# Patient Record
Sex: FEMALE | Race: WHITE
[De-identification: names, ages, dates, MRNs, and addresses within clinical notes are randomized per-mention and may not be internally consistent; named-entity substitution may affect disease eponyms.]

---

## 2017-05-17 ENCOUNTER — HOSPITAL ENCOUNTER (OUTPATIENT)
Dept: HOSPITAL 45 - C.ULTRBC | Age: 47
Discharge: HOME | End: 2017-05-17
Attending: PHYSICIAN ASSISTANT
Payer: COMMERCIAL

## 2017-05-17 DIAGNOSIS — I82.812: Primary | ICD-10-CM

## 2017-05-17 NOTE — DIAGNOSTIC IMAGING REPORT
LEFT LOWER EXTREMITY VENOUS DOPPLER



HISTORY:      M79.605 Left leg [id number]



COMPARISON STUDY:  None.



FINDINGS: There is normal compressibility, flow, and augmentation within the

left lower extremity deep venous system. However, there is thrombus identified

within the greater saphenous vein at the mid calf extending up to the level of

the knee.



IMPRESSION:  

No DVT within the left lower extremity. Thrombosed greater saphenous vein within

the left lower leg.







Electronically signed by:  David Perez M.D.

5/17/2017 3:20 PM



Dictated Date/Time:  5/17/2017 3:19 PM

## 2017-06-01 ENCOUNTER — HOSPITAL ENCOUNTER (OUTPATIENT)
Dept: HOSPITAL 45 - C.MAMM | Age: 47
Discharge: HOME | End: 2017-06-01
Attending: OBSTETRICS & GYNECOLOGY
Payer: COMMERCIAL

## 2017-06-01 DIAGNOSIS — Z12.31: Primary | ICD-10-CM

## 2017-06-01 NOTE — MAMMOGRAPHY REPORT
BILATERAL DIGITAL SCREENING MAMMOGRAM TOMOSYNTHESIS WITH CAD: 6/1/2017

CLINICAL HISTORY: Routine screening.  Patient has no complaints.  





TECHNIQUE:  Breast tomosynthesis in addition to standard 2D mammography was performed. Current study 
was also evaluated with a Computer Aided Detection (CAD) system.  



COMPARISON: Comparison is made to exams dated:  5/27/2016 mammogram, 5/26/2015 mammogram, 5/19/2014 m
ammogram, 5/15/2013 mammogram, 8/2/2011 mammogram, and 6/23/2010 mammogram - [facility]
nter.   



BREAST COMPOSITION:  The tissue of both breasts is heterogeneously dense, which may obscure small mas
ses.  



FINDINGS:  No suspicious masses, calcifications, or areas of architectural distortion are noted in ei
ther breast. There has been no significant interval change compared to prior exams.  The previously s
een 2 adjacent benign cysts in the left 12:00 breast are decreased in size compared to the 2016 exam.
  Bilateral benign-appearing calcifications are not significantly changed.





IMPRESSION:  ACR BI-RADS CATEGORY 2: BENIGN

There is no mammographic evidence of malignancy. A 1 year screening mammogram is recommended.  The pa
tient will receive written notification of the results.  





Approximately 10% of breast cancers are not detected with mammography. A negative mammographic report
 should not delay biopsy if a clinically suggestive mass is present.



Ester Mohamud M.D.          

ah/:6/1/2017 08:40:46  



Imaging Technologist: Paz MENDIOLA)(JEANA), [facility]

letter sent: Normal 1/2  

BI-RADS Code: ACR BI-RADS Category 2: Benign

## 2017-12-04 ENCOUNTER — HOSPITAL ENCOUNTER (OUTPATIENT)
Dept: HOSPITAL 45 - C.CTS | Age: 47
Discharge: HOME | End: 2017-12-04
Attending: PHYSICIAN ASSISTANT
Payer: COMMERCIAL

## 2017-12-04 DIAGNOSIS — R07.89: ICD-10-CM

## 2017-12-04 DIAGNOSIS — I80.02: Primary | ICD-10-CM

## 2017-12-04 DIAGNOSIS — M79.605: ICD-10-CM

## 2017-12-04 NOTE — DIAGNOSTIC IMAGING REPORT
CT ANGIOGRAPHY OF THE CHEST, PULMONARY EMBOLUS PROTOCOL



CLINICAL HISTORY: Chest discomfort. Superficial thrombosis of leg.    



COMPARISON STUDY:  No previous studies for comparison. 



TECHNIQUE: Following IV administration of 93 mL of Optiray-320, helical axial

images of the chest were obtained utilizing the pulmonary embolus protocol. 

Maximal intensity projections and sagittal and coronal reformats were viewed on

an independent 3D workstation.  IV contrast was administered without

complication.  A dose lowering technique was utilized adhering to the principles

of ALARA.





CT DOSE: 330.85 mGycm



FINDINGS:  No pulmonary emboli are identified. There is no evidence for thoracic

aortic dissection. The size of the heart is normal. There is no pericardial

effusion. No enlarged thoracic lymph nodes are present. There is no

consolidation to suggest pneumonia and the central airways are patent. Note is

made of a 1 cm cystic lesion within the left upper lobe shown on image 201 of

303. This has a thin wall with possible fine internal septations. There is no

nodular component. Bony thorax and upper abdomen are unremarkable.



IMPRESSION:  



1. No pulmonary emboli identified.



2. No acute intrathoracic findings.



3. 1 cm left upper lobe cystic lesion. This likely reflects a cyst and is low

suspicion. However, a follow-up chest CT in one year to ensure stability is

recommended.











Electronically signed by:  Ed Vazquez M.D.

12/4/2017 12:05 PM



Dictated Date/Time:  12/4/2017 11:56 AM

## 2017-12-04 NOTE — DIAGNOSTIC IMAGING REPORT
VENOUS DOPP LOWER EXT UNILAT



CLINICAL HISTORY: M79.605 Left leg painU pain. Edema.



TECHNIQUE: Venous Doppler



COMPARISON STUDY:  10/9/2013



FINDINGS: Focal occlusive thrombus present within the left greater saphenous

vein at the mid calf level. This is considered chronic. All major deep venous

structures are intact. Compressibility and augmentation characteristics are

unremarkable.



IMPRESSION:  

1. Study is negative for deep venous thrombosis.

2. 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 









The above report was generated using voice recognition software.  It may contain

grammatical, syntax or spelling errors.







Electronically signed by:  Brenden Carrillo M.D.

12/4/2017 12:35 PM



Dictated Date/Time:  12/4/2017 12:33 PM

## 2018-04-20 ENCOUNTER — HOSPITAL ENCOUNTER (OUTPATIENT)
Dept: HOSPITAL 45 - C.LABSPEC | Age: 48
End: 2018-04-20
Attending: OBSTETRICS & GYNECOLOGY
Payer: COMMERCIAL

## 2018-04-20 DIAGNOSIS — Z01.419: ICD-10-CM

## 2018-04-20 DIAGNOSIS — H43.819: Primary | ICD-10-CM
